# Patient Record
Sex: FEMALE | Employment: STUDENT | URBAN - NONMETROPOLITAN AREA
[De-identification: names, ages, dates, MRNs, and addresses within clinical notes are randomized per-mention and may not be internally consistent; named-entity substitution may affect disease eponyms.]

---

## 2024-09-04 ENCOUNTER — LAB (OUTPATIENT)
Age: 24
End: 2024-09-04

## 2024-09-04 DIAGNOSIS — N92.6 IRREGULAR BLEEDING: Primary | ICD-10-CM

## 2024-09-04 PROCEDURE — 96372 THER/PROPH/DIAG INJ SC/IM: CPT | Performed by: NURSE PRACTITIONER

## 2024-09-04 RX ORDER — MEDROXYPROGESTERONE ACETATE 150 MG/ML
150 INJECTION, SUSPENSION INTRAMUSCULAR ONCE
Status: COMPLETED | OUTPATIENT
Start: 2024-09-04 | End: 2024-09-04

## 2024-09-04 RX ADMIN — MEDROXYPROGESTERONE ACETATE 150 MG: 150 INJECTION, SUSPENSION INTRAMUSCULAR at 15:51

## 2025-02-13 ENCOUNTER — NURSE ONLY (OUTPATIENT)
Age: 25
End: 2025-02-13

## 2025-02-13 DIAGNOSIS — Z30.42 ENCOUNTER FOR SURVEILLANCE OF INJECTABLE CONTRACEPTIVE: Primary | ICD-10-CM

## 2025-02-13 LAB
HCG, URINE, POC: NEGATIVE
Lab: NORMAL
NEGATIVE QC PASS/FAIL: NORMAL
POSITIVE QC PASS/FAIL: NORMAL

## 2025-02-13 PROCEDURE — 96372 THER/PROPH/DIAG INJ SC/IM: CPT | Performed by: NURSE PRACTITIONER

## 2025-02-13 PROCEDURE — 81025 URINE PREGNANCY TEST: CPT | Performed by: NURSE PRACTITIONER

## 2025-02-13 RX ORDER — MEDROXYPROGESTERONE ACETATE 150 MG/ML
150 INJECTION, SUSPENSION INTRAMUSCULAR ONCE
Status: COMPLETED | OUTPATIENT
Start: 2025-02-13 | End: 2025-02-13

## 2025-02-13 RX ADMIN — MEDROXYPROGESTERONE ACETATE 150 MG: 150 INJECTION, SUSPENSION INTRAMUSCULAR at 14:35

## 2025-02-13 NOTE — PROGRESS NOTES
Patient presents for depo injection. Patient reports she did receive injection over break on 11/27. Unable to get record and pregnancy test was obtained. Depo shot given in left deltoid. Patient tolerated well.

## 2025-02-27 ENCOUNTER — OFFICE VISIT (OUTPATIENT)
Age: 25
End: 2025-02-27
Payer: COMMERCIAL

## 2025-02-27 DIAGNOSIS — F43.22 ADJUSTMENT DISORDER WITH ANXIOUS MOOD: Primary | ICD-10-CM

## 2025-02-27 PROBLEM — F41.1 GENERALIZED ANXIETY DISORDER: Status: ACTIVE | Noted: 2025-02-27

## 2025-02-27 PROCEDURE — 90791 PSYCH DIAGNOSTIC EVALUATION: CPT | Performed by: COUNSELOR

## 2025-02-27 NOTE — PROGRESS NOTES
[facility] Intake  Patient Name: Mickie Lopez   Provider: Kady Foss [facility]   2/27/2025  3:35 PM      Time spent with Patient:  45 minutes    Reason for Consult:    Chief Complaint   Patient presents with    Anxiety          Pt provided informed consent for the [facility]. Discussed with patient model of service to include the limits of confidentiality (i.e. abuse reporting, suicide intervention, etc.) and short-term intervention focused approach.  Pt indicated understanding.      Subjective:  Presenting Problem:  Student reports struggling with symptoms of anxiety such as feeling overwhelmed, heart palpitations, shortness of breath, racing thoughts, irrational fears, sweating and fatigue, which lasts everyday all day since arriving to [facility]. According to student she first noticed her anxiety during grade school however only when facing something new or away from her family and friends. Once she entered undergrad she noticed that her anxiety was less manageable, which is why she went to therapy. Since that time her symptoms have become progressively worse and unmanageable, interfering with her academic performance and ability to feel at ease at the [facility] [employer] or when she is home alone over the weekends.     Objective:  MSE:    Appearance    alert, cooperative, healthy  Appetite normal  Sleep disturbance No  Loss of pleasure No  Speech    spontaneous, normal rate, normal volume, and well articulated  Mood    Anxious  Affect    anxiety  Thought Content    intact  Insight    Fair  Judgment    Intact  Suicide Assessment    no suicidal ideation   If yes, please indicate further:      History:    Trauma History: NR    Prior MH treatment: According to student, she met with a therapist for 3 sessions during her undergrad to address her anxiety.  She did not find it helpful.     Medications:   No current outpatient medications on file.     No current

## 2025-02-28 ENCOUNTER — OFFICE VISIT (OUTPATIENT)
Age: 25
End: 2025-02-28

## 2025-02-28 VITALS
HEART RATE: 102 BPM | RESPIRATION RATE: 16 BRPM | BODY MASS INDEX: 25.21 KG/M2 | WEIGHT: 137 LBS | HEIGHT: 62 IN | TEMPERATURE: 98.2 F | DIASTOLIC BLOOD PRESSURE: 74 MMHG | OXYGEN SATURATION: 98 % | SYSTOLIC BLOOD PRESSURE: 120 MMHG

## 2025-02-28 DIAGNOSIS — F41.1 GENERALIZED ANXIETY DISORDER: Primary | ICD-10-CM

## 2025-02-28 PROBLEM — L70.0 ACNE VULGARIS: Status: ACTIVE | Noted: 2023-12-20

## 2025-02-28 PROBLEM — K21.9 GERD (GASTROESOPHAGEAL REFLUX DISEASE): Status: ACTIVE | Noted: 2024-07-12

## 2025-02-28 PROBLEM — J45.20 MILD INTERMITTENT ASTHMA: Status: RESOLVED | Noted: 2023-12-20 | Resolved: 2025-02-28

## 2025-02-28 PROBLEM — K21.9 GERD (GASTROESOPHAGEAL REFLUX DISEASE): Status: RESOLVED | Noted: 2024-07-12 | Resolved: 2025-02-28

## 2025-02-28 PROBLEM — J45.20 MILD INTERMITTENT ASTHMA: Status: ACTIVE | Noted: 2023-12-20

## 2025-02-28 PROBLEM — L70.0 ACNE VULGARIS: Status: RESOLVED | Noted: 2023-12-20 | Resolved: 2025-02-28

## 2025-02-28 PROCEDURE — 99203 OFFICE O/P NEW LOW 30 MIN: CPT | Performed by: NURSE PRACTITIONER

## 2025-02-28 RX ORDER — HYDROXYZINE HYDROCHLORIDE 25 MG/1
1 TABLET, FILM COATED ORAL 2 TIMES DAILY
COMMUNITY
Start: 2024-08-15

## 2025-02-28 RX ORDER — MEDROXYPROGESTERONE ACETATE 150 MG/ML
INJECTION, SUSPENSION INTRAMUSCULAR
COMMUNITY
Start: 2025-02-05

## 2025-02-28 RX ORDER — DEXTROAMPHETAMINE SACCHARATE, AMPHETAMINE ASPARTATE, DEXTROAMPHETAMINE SULFATE AND AMPHETAMINE SULFATE 1.25; 1.25; 1.25; 1.25 MG/1; MG/1; MG/1; MG/1
TABLET ORAL
COMMUNITY
Start: 2024-11-26

## 2025-02-28 RX ORDER — ESCITALOPRAM OXALATE 5 MG/1
5 TABLET ORAL DAILY
Qty: 30 TABLET | Refills: 0 | Status: SHIPPED | OUTPATIENT
Start: 2025-02-28

## 2025-02-28 RX ORDER — SPIRONOLACTONE 50 MG/1
100 TABLET, FILM COATED ORAL NIGHTLY
COMMUNITY

## 2025-02-28 RX ORDER — BECLOMETHASONE DIPROPIONATE HFA 80 UG/1
AEROSOL, METERED RESPIRATORY (INHALATION)
COMMUNITY
Start: 2024-12-17

## 2025-02-28 SDOH — ECONOMIC STABILITY: FOOD INSECURITY: WITHIN THE PAST 12 MONTHS, YOU WORRIED THAT YOUR FOOD WOULD RUN OUT BEFORE YOU GOT MONEY TO BUY MORE.: NEVER TRUE

## 2025-02-28 SDOH — ECONOMIC STABILITY: FOOD INSECURITY: WITHIN THE PAST 12 MONTHS, THE FOOD YOU BOUGHT JUST DIDN'T LAST AND YOU DIDN'T HAVE MONEY TO GET MORE.: NEVER TRUE

## 2025-02-28 ASSESSMENT — PATIENT HEALTH QUESTIONNAIRE - PHQ9
10. IF YOU CHECKED OFF ANY PROBLEMS, HOW DIFFICULT HAVE THESE PROBLEMS MADE IT FOR YOU TO DO YOUR WORK, TAKE CARE OF THINGS AT HOME, OR GET ALONG WITH OTHER PEOPLE: VERY DIFFICULT
7. TROUBLE CONCENTRATING ON THINGS, SUCH AS READING THE NEWSPAPER OR WATCHING TELEVISION: SEVERAL DAYS
SUM OF ALL RESPONSES TO PHQ QUESTIONS 1-9: 6
1. LITTLE INTEREST OR PLEASURE IN DOING THINGS: SEVERAL DAYS
9. THOUGHTS THAT YOU WOULD BE BETTER OFF DEAD, OR OF HURTING YOURSELF: NOT AT ALL
6. FEELING BAD ABOUT YOURSELF - OR THAT YOU ARE A FAILURE OR HAVE LET YOURSELF OR YOUR FAMILY DOWN: NOT AT ALL
SUM OF ALL RESPONSES TO PHQ QUESTIONS 1-9: 6
8. MOVING OR SPEAKING SO SLOWLY THAT OTHER PEOPLE COULD HAVE NOTICED. OR THE OPPOSITE, BEING SO FIGETY OR RESTLESS THAT YOU HAVE BEEN MOVING AROUND A LOT MORE THAN USUAL: NOT AT ALL
4. FEELING TIRED OR HAVING LITTLE ENERGY: MORE THAN HALF THE DAYS
SUM OF ALL RESPONSES TO PHQ QUESTIONS 1-9: 6
2. FEELING DOWN, DEPRESSED OR HOPELESS: SEVERAL DAYS
3. TROUBLE FALLING OR STAYING ASLEEP: NOT AT ALL
SUM OF ALL RESPONSES TO PHQ QUESTIONS 1-9: 6
SUM OF ALL RESPONSES TO PHQ9 QUESTIONS 1 & 2: 2
5. POOR APPETITE OR OVEREATING: SEVERAL DAYS

## 2025-02-28 ASSESSMENT — ENCOUNTER SYMPTOMS
DIARRHEA: 0
ABDOMINAL PAIN: 0
WHEEZING: 0
VOMITING: 0
SHORTNESS OF BREATH: 0
NAUSEA: 0
COUGH: 0
CHEST TIGHTNESS: 0

## 2025-02-28 ASSESSMENT — ANXIETY QUESTIONNAIRES
GAD7 TOTAL SCORE: 12
5. BEING SO RESTLESS THAT IT IS HARD TO SIT STILL: NOT AT ALL
2. NOT BEING ABLE TO STOP OR CONTROL WORRYING: NEARLY EVERY DAY
6. BECOMING EASILY ANNOYED OR IRRITABLE: MORE THAN HALF THE DAYS
7. FEELING AFRAID AS IF SOMETHING AWFUL MIGHT HAPPEN: NOT AT ALL
3. WORRYING TOO MUCH ABOUT DIFFERENT THINGS: MORE THAN HALF THE DAYS
4. TROUBLE RELAXING: MORE THAN HALF THE DAYS
IF YOU CHECKED OFF ANY PROBLEMS ON THIS QUESTIONNAIRE, HOW DIFFICULT HAVE THESE PROBLEMS MADE IT FOR YOU TO DO YOUR WORK, TAKE CARE OF THINGS AT HOME, OR GET ALONG WITH OTHER PEOPLE: VERY DIFFICULT
1. FEELING NERVOUS, ANXIOUS, OR ON EDGE: NEARLY EVERY DAY

## 2025-02-28 NOTE — PROGRESS NOTES
[facility] [place]  [facility] [place] [address] [facility] [address]  Dept: [phone]  Loc: [phone]     Mickie Lopez is a 24 y.o. female who presents today for:  Chief Complaint   Patient presents with    Follow-up     Patient presents to discuss medication. Counseling Center referral.        Assessment/Plan:     1. Generalized anxiety disorder  -start on escitalopram.  Will start at 5 mg since she also takes Adderall- discussed risk of serotonin syndrome.    -F/u with health center in 4 weeks, sooner if needed.  Continue counseling.  -consider labs at next appt    During the first week after starting this medication, you may experience headaches and nausea- this should improve after the first week or two.  Other common side effects include sleep disturbance, sexual dysfunction, and weight gain.  Do not drink alcohol with this medication.  Symptoms may start to improve within 3-4 weeks but may take up to 6.  Do not abruptly stop this medication- if at any time you would like to stop, please reach out to a health care provider.  Serotonin syndrome is a rare but serious side effect of this mediation- go to the ER if you experience muscle twitching/tremors, muscle rigidity, involuntary muscle movements, profuse sweating, rapid heartrate, high temperature, or confusion.  There is a Black Box warning that states rarely patients may experience worsening depression or suicidal thoughts with this medication- if you experience this, reach out to a health care provider, go to the hospital, or call [id number].        No results found for any visits on 02/28/25.     Medications Ordered:  Orders Placed This Encounter   Medications    escitalopram (LEXAPRO) 5 MG tablet     Sig: Take 1 tablet by mouth daily     Dispense:  30 tablet     Refill:  0       Return in about 4 weeks (around 3/28/2025).    Future Appointments   Date Time Provider Department Center   3/28/2025

## 2025-03-17 ENCOUNTER — OFFICE VISIT (OUTPATIENT)
Age: 25
End: 2025-03-17

## 2025-03-17 VITALS
OXYGEN SATURATION: 98 % | HEIGHT: 62 IN | TEMPERATURE: 98.2 F | DIASTOLIC BLOOD PRESSURE: 74 MMHG | RESPIRATION RATE: 16 BRPM | BODY MASS INDEX: 25.21 KG/M2 | WEIGHT: 137 LBS | HEART RATE: 108 BPM | SYSTOLIC BLOOD PRESSURE: 124 MMHG

## 2025-03-17 DIAGNOSIS — R11.0 NAUSEA: ICD-10-CM

## 2025-03-17 DIAGNOSIS — F41.1 GENERALIZED ANXIETY DISORDER: Primary | ICD-10-CM

## 2025-03-17 PROCEDURE — 99213 OFFICE O/P EST LOW 20 MIN: CPT | Performed by: NURSE PRACTITIONER

## 2025-03-17 RX ORDER — ONDANSETRON 4 MG/1
4 TABLET, ORALLY DISINTEGRATING ORAL EVERY 12 HOURS PRN
Qty: 6 TABLET | Refills: 0 | Status: SHIPPED | OUTPATIENT
Start: 2025-03-17

## 2025-03-17 ASSESSMENT — ENCOUNTER SYMPTOMS
VOMITING: 0
ABDOMINAL PAIN: 0
CHEST TIGHTNESS: 0
WHEEZING: 0
DIARRHEA: 1
SHORTNESS OF BREATH: 0
NAUSEA: 1
COUGH: 0

## 2025-03-17 NOTE — PROGRESS NOTES
not ill-appearing or toxic-appearing.   Cardiovascular:      Rate and Rhythm: Regular rhythm. Tachycardia present.      Pulses: Normal pulses.      Heart sounds: Normal heart sounds.   Pulmonary:      Effort: Pulmonary effort is normal. No respiratory distress.      Breath sounds: Normal breath sounds.   Neurological:      Mental Status: She is alert.   Psychiatric:         Attention and Perception: Attention and perception normal.         Mood and Affect: Mood is anxious. Affect is tearful.         Speech: Speech normal.         Behavior: Behavior normal. Behavior is cooperative.         Thought Content: Thought content normal. Thought content does not include suicidal ideation.         Cognition and Memory: Cognition and memory normal.         Judgment: Judgment normal.           Patient given educational materials - see patient instructions.  Discussed use, benefit, and sideeffects of prescribed medications.  All patient questions answered.  Pt voiced understanding. Reviewed health maintenance.  Instructed to continue current medications, diet and exercise.  Patient agreed with treatment plan.Follow up as directed.     Electronically signed by DINORAH Pacheco CNP on 3/17/2025 at 2:31 PM

## 2025-03-18 ENCOUNTER — TELEPHONE (OUTPATIENT)
Age: 25
End: 2025-03-18

## 2025-03-18 DIAGNOSIS — F41.1 GENERALIZED ANXIETY DISORDER: Primary | ICD-10-CM

## 2025-03-18 NOTE — TELEPHONE ENCOUNTER
Spoke to pt as f/u to appt yesterday, she is requesting a referral to psychiatry at this time.  She is feeling better today.  Plans to call the counseling center today to schedule an appt for this week.

## 2025-05-01 ENCOUNTER — CLINICAL SUPPORT (OUTPATIENT)
Age: 25
End: 2025-05-01

## 2025-05-01 DIAGNOSIS — Z30.42 ENCOUNTER FOR SURVEILLANCE OF INJECTABLE CONTRACEPTIVE: Primary | ICD-10-CM

## 2025-05-01 PROCEDURE — 96372 THER/PROPH/DIAG INJ SC/IM: CPT | Performed by: NURSE PRACTITIONER

## 2025-05-01 RX ORDER — MEDROXYPROGESTERONE ACETATE 150 MG/ML
150 INJECTION, SUSPENSION INTRAMUSCULAR ONCE
Status: COMPLETED | OUTPATIENT
Start: 2025-05-01 | End: 2025-05-01

## 2025-05-01 RX ADMIN — MEDROXYPROGESTERONE ACETATE 150 MG: 150 INJECTION, SUSPENSION INTRAMUSCULAR at 13:19

## 2025-05-01 NOTE — PROGRESS NOTES
Patient received depo injection. Patient tolerated well. Patient due 07/17-07/31.   Returned to patient